# Patient Record
Sex: FEMALE | URBAN - METROPOLITAN AREA
[De-identification: names, ages, dates, MRNs, and addresses within clinical notes are randomized per-mention and may not be internally consistent; named-entity substitution may affect disease eponyms.]

---

## 2022-03-30 ENCOUNTER — OFFICE VISIT (OUTPATIENT)
Dept: URBAN - METROPOLITAN AREA CLINIC 91 | Facility: CLINIC | Age: 52
End: 2022-03-30
Payer: MEDICAID

## 2022-03-30 DIAGNOSIS — H25.13 AGE-RELATED NUCLEAR CATARACT, BILATERAL: Primary | ICD-10-CM

## 2022-03-30 PROCEDURE — 99204 OFFICE O/P NEW MOD 45 MIN: CPT | Performed by: OPHTHALMOLOGY

## 2022-03-30 ASSESSMENT — VISUAL ACUITY
OS: HM
OD: HM

## 2022-03-30 ASSESSMENT — INTRAOCULAR PRESSURE
OD: 15
OS: 17

## 2022-04-06 ENCOUNTER — PRE-OPERATIVE VISIT (OUTPATIENT)
Dept: URBAN - METROPOLITAN AREA CLINIC 91 | Facility: CLINIC | Age: 52
End: 2022-04-06
Payer: COMMERCIAL

## 2022-04-06 RX ORDER — OFLOXACIN 3 MG/ML
0.3 % SOLUTION/ DROPS OPHTHALMIC
Qty: 2 | Refills: 3 | Status: ACTIVE
Start: 2022-04-06

## 2022-04-06 RX ORDER — KETOROLAC TROMETHAMINE 5 MG/ML
0.5 % SOLUTION OPHTHALMIC
Qty: 3 | Refills: 3 | Status: ACTIVE
Start: 2022-04-06

## 2022-04-06 RX ORDER — PREDNISOLONE ACETATE 10 MG/ML
1 % SUSPENSION/ DROPS OPHTHALMIC
Qty: 5 | Refills: 3 | Status: ACTIVE
Start: 2022-04-06

## 2022-04-06 ASSESSMENT — PACHYMETRY
OD: 23.12
OD: 3.34
OS: 23.10
OS: 3.32

## 2022-04-15 ENCOUNTER — POST-OPERATIVE VISIT (OUTPATIENT)
Dept: URBAN - METROPOLITAN AREA CLINIC 91 | Facility: CLINIC | Age: 52
End: 2022-04-15
Payer: COMMERCIAL

## 2022-04-15 DIAGNOSIS — Z96.1 PRESENCE OF INTRAOCULAR LENS: Primary | ICD-10-CM

## 2022-04-15 PROCEDURE — 99024 POSTOP FOLLOW-UP VISIT: CPT | Performed by: OPHTHALMOLOGY

## 2022-04-15 NOTE — IMPRESSION/PLAN
Impression: S/P CE IOL Standard OS - 3 Days. Presence of intraocular lens  Z96.1. Excellent post op course   Post operative instructions reviewed - Condition is improving - Cataract OS. Plan: Patient is healing well status post CE IOL. I stressed importance of patient avoiding rubbing the eye, staying out of swimming pools, hot tubs and saunas for 10 days. Patient should continue to wear shield at bedtime for 1 week. I explained post-op medication schedule with patient. Patient will continue with Antibiotic 3 times per day for 7 days then discontinue, Steriod 4 times per day x 7 days then decrease to 2 times per day and NSAID 1 times per day x7 days and then discontinue.  pred-oflox-keto OS QID

## 2022-04-20 ENCOUNTER — POST-OPERATIVE VISIT (OUTPATIENT)
Dept: URBAN - METROPOLITAN AREA CLINIC 91 | Facility: CLINIC | Age: 52
End: 2022-04-20
Payer: COMMERCIAL

## 2022-04-20 DIAGNOSIS — Z48.810 ENCOUNTER FOR SURGICAL AFTERCARE FOLLOWING SURGERY ON A SENSE ORGAN: Primary | ICD-10-CM

## 2022-04-20 PROCEDURE — 99024 POSTOP FOLLOW-UP VISIT: CPT | Performed by: OPHTHALMOLOGY

## 2022-04-20 RX ORDER — KETOROLAC TROMETHAMINE 5 MG/ML
0.5 % SOLUTION OPHTHALMIC
Qty: 3 | Refills: 3 | Status: ACTIVE
Start: 2022-04-20

## 2022-04-20 ASSESSMENT — VISUAL ACUITY: OD: 20/20

## 2022-04-20 NOTE — IMPRESSION/PLAN
Impression: S/P CE IOL Standard OS - 8 Days. Encounter for surgical aftercare following surgery on a sense organ  Z48.810. Excellent post op course   Post operative instructions reviewed - Condition is improving - Cataract OS. Plan: Patient is healing well status post CE IOL OS. It is ok for patient to discontinue wearing protective shield at bedtime, and may also resume normal activity at this time. I discussed post-op medication schedule with patient. Patient should continue with steroid 2 times a day x3-4 weeks and then discontinue.

## 2022-04-26 ENCOUNTER — SURGERY (OUTPATIENT)
Dept: URBAN - METROPOLITAN AREA EXTERNAL CLINIC 49 | Facility: EXTERNAL CLINIC | Age: 52
End: 2022-04-26
Payer: COMMERCIAL

## 2022-04-26 PROCEDURE — 66982 XCAPSL CTRC RMVL CPLX WO ECP: CPT | Performed by: OPHTHALMOLOGY

## 2022-05-06 ENCOUNTER — POST-OPERATIVE VISIT (OUTPATIENT)
Dept: URBAN - METROPOLITAN AREA CLINIC 91 | Facility: CLINIC | Age: 52
End: 2022-05-06
Payer: COMMERCIAL

## 2022-05-06 DIAGNOSIS — Z96.1 PRESENCE OF INTRAOCULAR LENS: Primary | ICD-10-CM

## 2022-05-06 ASSESSMENT — INTRAOCULAR PRESSURE
OD: 14
OS: 14

## 2022-05-06 NOTE — IMPRESSION/PLAN
Impression: S/P CE IOL Standard OD - 10 Days. Presence of intraocular lens  Z96.1. Post operative instructions reviewed - Plan: Patient is healing well status post CE IOL. It is ok for patient to discontinue wearing protective shield at bedtime, and may also resume normal activity at this time. I discussed post-op medication schedule with patient. Patient should continue with steroid 2 times a day x3-4 weeks and then discontinue.